# Patient Record
Sex: MALE | Race: BLACK OR AFRICAN AMERICAN | NOT HISPANIC OR LATINO | Employment: UNEMPLOYED | URBAN - METROPOLITAN AREA
[De-identification: names, ages, dates, MRNs, and addresses within clinical notes are randomized per-mention and may not be internally consistent; named-entity substitution may affect disease eponyms.]

---

## 2023-06-01 ENCOUNTER — HOSPITAL ENCOUNTER (EMERGENCY)
Facility: HOSPITAL | Age: 33
Discharge: HOME | End: 2023-06-01
Attending: EMERGENCY MEDICINE | Admitting: EMERGENCY MEDICINE

## 2023-06-01 VITALS
HEART RATE: 52 BPM | OXYGEN SATURATION: 99 % | DIASTOLIC BLOOD PRESSURE: 71 MMHG | SYSTOLIC BLOOD PRESSURE: 118 MMHG | RESPIRATION RATE: 18 BRPM | TEMPERATURE: 97.9 F | WEIGHT: 180 LBS

## 2023-06-01 DIAGNOSIS — M54.9 CHRONIC NECK AND BACK PAIN: ICD-10-CM

## 2023-06-01 DIAGNOSIS — G89.29 CHRONIC NECK AND BACK PAIN: ICD-10-CM

## 2023-06-01 DIAGNOSIS — R11.2 NAUSEA AND VOMITING: ICD-10-CM

## 2023-06-01 DIAGNOSIS — R10.13 ABDOMINAL PAIN, EPIGASTRIC: ICD-10-CM

## 2023-06-01 DIAGNOSIS — K21.9 ACID REFLUX: ICD-10-CM

## 2023-06-01 DIAGNOSIS — M54.2 CHRONIC NECK AND BACK PAIN: ICD-10-CM

## 2023-06-01 LAB
ALBUMIN SERPL BCG-MCNC: 4.3 G/DL (ref 3.5–5.2)
ALP SERPL-CCNC: 98 U/L (ref 40–129)
ALT SERPL W P-5'-P-CCNC: 33 U/L (ref 10–50)
ANION GAP SERPL CALCULATED.3IONS-SCNC: 13 MMOL/L (ref 7–15)
AST SERPL W P-5'-P-CCNC: 22 U/L (ref 10–50)
BASOPHILS # BLD AUTO: 0.1 10E3/UL (ref 0–0.2)
BASOPHILS NFR BLD AUTO: 1 %
BILIRUB DIRECT SERPL-MCNC: <0.2 MG/DL (ref 0–0.3)
BILIRUB SERPL-MCNC: 0.3 MG/DL
BUN SERPL-MCNC: 11.3 MG/DL (ref 6–20)
CALCIUM SERPL-MCNC: 9.7 MG/DL (ref 8.6–10)
CHLORIDE SERPL-SCNC: 104 MMOL/L (ref 98–107)
CREAT SERPL-MCNC: 0.9 MG/DL (ref 0.67–1.17)
DEPRECATED HCO3 PLAS-SCNC: 25 MMOL/L (ref 22–29)
EOSINOPHIL # BLD AUTO: 0.2 10E3/UL (ref 0–0.7)
EOSINOPHIL NFR BLD AUTO: 2 %
ERYTHROCYTE [DISTWIDTH] IN BLOOD BY AUTOMATED COUNT: 13.6 % (ref 10–15)
GFR SERPL CREATININE-BSD FRML MDRD: >90 ML/MIN/1.73M2
GLUCOSE SERPL-MCNC: 95 MG/DL (ref 70–99)
HCT VFR BLD AUTO: 47.3 % (ref 40–53)
HGB BLD-MCNC: 15.6 G/DL (ref 13.3–17.7)
IMM GRANULOCYTES # BLD: 0 10E3/UL
IMM GRANULOCYTES NFR BLD: 0 %
LIPASE SERPL-CCNC: 22 U/L (ref 13–60)
LYMPHOCYTES # BLD AUTO: 4.4 10E3/UL (ref 0.8–5.3)
LYMPHOCYTES NFR BLD AUTO: 49 %
MCH RBC QN AUTO: 29.4 PG (ref 26.5–33)
MCHC RBC AUTO-ENTMCNC: 33 G/DL (ref 31.5–36.5)
MCV RBC AUTO: 89 FL (ref 78–100)
MONOCYTES # BLD AUTO: 0.8 10E3/UL (ref 0–1.3)
MONOCYTES NFR BLD AUTO: 9 %
NEUTROPHILS # BLD AUTO: 3.4 10E3/UL (ref 1.6–8.3)
NEUTROPHILS NFR BLD AUTO: 39 %
NRBC # BLD AUTO: 0 10E3/UL
NRBC BLD AUTO-RTO: 0 /100
PLATELET # BLD AUTO: 310 10E3/UL (ref 150–450)
POTASSIUM SERPL-SCNC: 3.8 MMOL/L (ref 3.4–5.3)
PROT SERPL-MCNC: 7.6 G/DL (ref 6.4–8.3)
RBC # BLD AUTO: 5.31 10E6/UL (ref 4.4–5.9)
SODIUM SERPL-SCNC: 142 MMOL/L (ref 136–145)
WBC # BLD AUTO: 8.9 10E3/UL (ref 4–11)

## 2023-06-01 PROCEDURE — 82947 ASSAY GLUCOSE BLOOD QUANT: CPT

## 2023-06-01 PROCEDURE — 96375 TX/PRO/DX INJ NEW DRUG ADDON: CPT

## 2023-06-01 PROCEDURE — 250N000013 HC RX MED GY IP 250 OP 250 PS 637

## 2023-06-01 PROCEDURE — 96374 THER/PROPH/DIAG INJ IV PUSH: CPT

## 2023-06-01 PROCEDURE — 36415 COLL VENOUS BLD VENIPUNCTURE: CPT

## 2023-06-01 PROCEDURE — 82248 BILIRUBIN DIRECT: CPT

## 2023-06-01 PROCEDURE — 82374 ASSAY BLOOD CARBON DIOXIDE: CPT

## 2023-06-01 PROCEDURE — 83690 ASSAY OF LIPASE: CPT

## 2023-06-01 PROCEDURE — 250N000011 HC RX IP 250 OP 636

## 2023-06-01 PROCEDURE — 85025 COMPLETE CBC W/AUTO DIFF WBC: CPT

## 2023-06-01 PROCEDURE — 99284 EMERGENCY DEPT VISIT MOD MDM: CPT | Mod: 25

## 2023-06-01 RX ORDER — KETOROLAC TROMETHAMINE 15 MG/ML
15 INJECTION, SOLUTION INTRAMUSCULAR; INTRAVENOUS ONCE
Status: COMPLETED | OUTPATIENT
Start: 2023-06-01 | End: 2023-06-01

## 2023-06-01 RX ORDER — METHOCARBAMOL 750 MG/1
750 TABLET, FILM COATED ORAL 4 TIMES DAILY PRN
Qty: 20 TABLET | Refills: 0 | Status: SHIPPED | OUTPATIENT
Start: 2023-06-01

## 2023-06-01 RX ORDER — ONDANSETRON 4 MG/1
4 TABLET, ORALLY DISINTEGRATING ORAL EVERY 8 HOURS PRN
Qty: 10 TABLET | Refills: 0 | Status: SHIPPED | OUTPATIENT
Start: 2023-06-01 | End: 2023-06-04

## 2023-06-01 RX ORDER — CYCLOBENZAPRINE HCL 10 MG
10 TABLET ORAL ONCE
Status: COMPLETED | OUTPATIENT
Start: 2023-06-01 | End: 2023-06-01

## 2023-06-01 RX ORDER — LIDOCAINE 4 G/G
1 PATCH TOPICAL EVERY 24 HOURS
Qty: 3 PATCH | Refills: 0 | Status: SHIPPED | OUTPATIENT
Start: 2023-06-01

## 2023-06-01 RX ORDER — LIDOCAINE 4 G/G
1 PATCH TOPICAL ONCE
Status: DISCONTINUED | OUTPATIENT
Start: 2023-06-01 | End: 2023-06-01 | Stop reason: HOSPADM

## 2023-06-01 RX ORDER — ONDANSETRON 2 MG/ML
4 INJECTION INTRAMUSCULAR; INTRAVENOUS ONCE
Status: COMPLETED | OUTPATIENT
Start: 2023-06-01 | End: 2023-06-01

## 2023-06-01 RX ORDER — FAMOTIDINE 20 MG/1
20 TABLET, FILM COATED ORAL 2 TIMES DAILY
Qty: 30 TABLET | Refills: 0 | Status: SHIPPED | OUTPATIENT
Start: 2023-06-01

## 2023-06-01 RX ORDER — MAGNESIUM HYDROXIDE/ALUMINUM HYDROXICE/SIMETHICONE 120; 1200; 1200 MG/30ML; MG/30ML; MG/30ML
15 SUSPENSION ORAL ONCE
Status: COMPLETED | OUTPATIENT
Start: 2023-06-01 | End: 2023-06-01

## 2023-06-01 RX ADMIN — FAMOTIDINE 20 MG: 10 INJECTION, SOLUTION INTRAVENOUS at 09:15

## 2023-06-01 RX ADMIN — ONDANSETRON 4 MG: 2 INJECTION INTRAMUSCULAR; INTRAVENOUS at 09:40

## 2023-06-01 RX ADMIN — CYCLOBENZAPRINE 10 MG: 10 TABLET, FILM COATED ORAL at 09:40

## 2023-06-01 RX ADMIN — ALUMINUM HYDROXIDE, MAGNESIUM HYDROXIDE, AND SIMETHICONE 15 ML: 200; 200; 20 SUSPENSION ORAL at 09:15

## 2023-06-01 RX ADMIN — KETOROLAC TROMETHAMINE 15 MG: 15 INJECTION, SOLUTION INTRAMUSCULAR; INTRAVENOUS at 09:15

## 2023-06-01 RX ADMIN — LIDOCAINE 1 PATCH: 4 PATCH TOPICAL at 10:42

## 2023-06-01 ASSESSMENT — ENCOUNTER SYMPTOMS
RHINORRHEA: 0
CHILLS: 0
CONSTIPATION: 0
NECK STIFFNESS: 0
NECK PAIN: 1
BACK PAIN: 1
DIARRHEA: 0
WEAKNESS: 0
NAUSEA: 1
SORE THROAT: 0
SHORTNESS OF BREATH: 0
ABDOMINAL PAIN: 1
BLOOD IN STOOL: 0
COUGH: 0
FEVER: 0
NUMBNESS: 0
VOMITING: 1
BRUISES/BLEEDS EASILY: 0

## 2023-06-01 ASSESSMENT — ACTIVITIES OF DAILY LIVING (ADL): ADLS_ACUITY_SCORE: 35

## 2023-06-01 NOTE — DISCHARGE INSTRUCTIONS
You were seen in the ER for evaluation of abdominal pain. Your workup and imaging was overall reassuring as discussed.     Rest, stay well hydrated (Pedialyte), follow BRAT diet (bananas, rice, applesauce, toast), and increase diet as tolerates. You may take Zofran as needed for nausea. You were prescribed Pepcid to take to help with acid reflux.    Rest, no heavy lifting. You may apply ice or heat to the area (do not apply ice directly to the skin). You may use a topical pain relieving lidocaine patch - only use for 12 hours (do NOT use longer than 12 hours in a 24 hour period as this can cause toxicity). You can continue to use Ibuprofen (do not exceed 3200 mg in 24 hrs) and/or Tylenol (do not exceed 4000 mg in 24 hrs) as needed. You may take Robaxin (muscle relaxer) as needed for muscle spasm.    Tylenol (Acetaminophen) Discharge Instructions:  You may take 2 tablets of regular strength, over-the-counter, Tylenol (acetaminophen) every 4-6 hours as needed for pain.  Take no more than 4000 mg of Tylenol in a 24-hour period.      Avoid taking more than 1 acetaminophen-containing product at a time and be aware that many over-the-counter medications contain a combination of acetaminophen and other products.  If you are taking Tylenol in addition to a combination product please keep track of your daily acetaminophen dose to make sure you do not exceed the recommended 4000 mg.  Taking too much acetaminophen can cause permanent damage to your liver.    Ibuprofen/Naproxen Discharge Instructions:  You may take ibuprofen for pain control.  The maximum dose of (ibuprofen is 3200 mg ) in a 24-hour period.    Take this medication with food to prevent stomach irritation.  With long-term use this medication can irritate the stomach causing pain and lead to development of a stomach ulcer.  If you notice stomach pain or vomiting of coffee-ground colored vomit or blood, please be seen by a healthcare provider.  Attempt to use this  medication for the shortest time possible.      Follow-up with primary care provider for reevaluation and ongoing management, referral placed today.     You can follow up with [facility] if your symptoms persist for reevaluation and ongoing management strategies including but not limited to possible injection and physical therapy, referral placed today.    Return to the emergency department for any new or worsening symptoms including severe abdominal pain, vomiting, vomiting blood, multiple episodes of diarrhea, bloody stools, uncontrolled fever/chills, chest pain, shortness of breath, worsening pain, new numbness/tingling/weakness, loss of bowel or bladder function, or any other concerning symptoms.     Take Care!  - Shelly Short PA-C

## 2023-06-01 NOTE — ED NOTES
Pt alert and oriented x4. Ambulated with a steady gait. Discharged to home with family. Pt given printed rx. Pt instructed to follow up with spine if needed.

## 2023-06-01 NOTE — ED PROVIDER NOTES
EMERGENCY DEPARTMENT ENCOUNTER      NAME: Mateusz Purcell  AGE: 33 year old male  YOB: 1990  MRN: [id number]  EVALUATION DATE & TIME: 2023  8:32 AM    PCP: No primary care provider on file.    ED PROVIDER: Shelly Short PA-C      Chief Complaint   Patient presents with     Abdominal Pain     Back Pain         FINAL IMPRESSION:  1. Chronic neck and back pain    2. Abdominal pain, epigastric    3. Nausea and vomiting    4. Acid reflux          ED COURSE & MEDICAL DECISION MAKIN:43 AM I met with the patient to gather history and perform my exam. ED course and treatment discussed.   9:05 AM I staffed the patient with Dr. Jackson.  10:00 AM Reevaluated and updated patient. Abdominal pain resolved and abdomen non-tender on reevaluation. I discussed the plan for discharge with the patient, and patient is agreeable. We discussed supportive cares at home and reasons for return to the ER including new or worsening symptoms. All questions and concerns addressed. Patient to be discharged by RN.    Pertinent Labs & Imaging studies reviewed. (See chart for details)  33 year old male presents to the Emergency Department for evaluation of chronic neck and back pain as well as abdominal pain and nausea. Upon exam, patient is afebrile, hemodynamically stable, and in no acute distress. Very minimal epigastric tenderness to palpation, otherwise, non-tender without guarding, rebound, or other peritoneal signs. Paraspinal muscular tenderness improved with palpation along entire back, no midline tenderness or overlying skin changes. Neck full ROM without pain. No nuchal rigidity. Differential diagnosis includes but not limited to gastritis, GERD, cyclic vomiting secondary to cannabis use, pancreatitis, gallbladder etiology, electrolyte abnormality, anemia, dehydration, muscle spasm/strain, slipped disc. Based on patient's presenting symptoms, laboratories and imaging were ordered. Emergent MRI is not indicated as  no new weakness or evidence of cauda equina syndrome (no saddle anesthesia, bowel/bladder incontinence/retention). Low suspicion for an epidural abscess as the patient denies hx of IVDU, no fevers/chills, or recent procedures. Low suspicion for infiltrative vertebral tumor as no associated weight loss, night sweats, or known history of cancer. Low suspicion for fracture as there was no preceding trauma/injury. Using shared decision making, no imaging of the spine was performed. CBC without leukocytosis or anemia. BMP WNL. Lipase WNL. HFP WNL.     Patient was treated with Toradol, Pepcid, Maalox, Zofran, Flexeril and Lidocaine patch upon arrival with resolution of abdominal symptoms and mild improvement in back pain. Symptoms and workup most consistent with muscle strain/spasm and gastritis vs GERD vs marijuana-induced nausea/vomiting. Low suspicion for life-threatening intraabdominal pathology given reassuring work-up and improvement with medications. Upon reevaluation, abdomen non-tender. Using shared decision making, CT abdomen/pelvis deferred at this time. Patient was made aware of the above findings. Plan to discharge patient home with prescription Pepcid, Zofran, Robaxin, and lidocaine patches, strict return precautions, and close follow up with PCP and [facility] for reevaluation and ongoing management, referrals placed today. The patient was stable and well appearing upon discharge. The patient was advised to return to the ER if any new or worsening symptoms develop. The patient verbalizes understanding and agrees with the plan.     Medical Decision Making    History:    Supplemental history from: Documented in chart, if applicable and Friend    External Record(s) reviewed: Documented in chart, if applicable. and Other: [facility] visit from 11/5/22    Work Up:    Chart documentation includes differential considered and any EKGs or imaging independently interpreted by provider, where  specified.    In additional to work up documented, I considered the following work up: Documented in chart, if applicable.    External consultation:    Discussion of management with another provider: Documented in chart, if applicable    Complicating factors:    Care impacted by chronic illness: N/A    Care affected by social determinants of health: Alcohol Abuse and/or Recreational Drug Use and Other: No primary care    Disposition considerations: Discharge. I prescribed additional prescription strength medication(s) as charted. See documentation for any additional details.      MEDICATIONS GIVEN IN THE EMERGENCY:  Medications   Lidocaine (LIDOCARE) 4 % Patch 1 patch (1 patch Transdermal $Patch/Med Applied 6/1/23 1042)   alum & mag hydroxide-simethicone (MAALOX) suspension 15 mL (15 mLs Oral $Given 6/1/23 0915)   famotidine (PEPCID) injection 20 mg (20 mg Intravenous $Given 6/1/23 0915)   ketorolac (TORADOL) injection 15 mg (15 mg Intravenous $Given 6/1/23 0915)   ondansetron (ZOFRAN) injection 4 mg (4 mg Intravenous $Given 6/1/23 0940)   cyclobenzaprine (FLEXERIL) tablet 10 mg (10 mg Oral $Given 6/1/23 0940)       NEW PRESCRIPTIONS STARTED AT TODAY'S ER VISIT  Discharge Medication List as of 6/1/2023 10:37 AM      START taking these medications    Details   famotidine (PEPCID) 20 MG tablet Take 1 tablet (20 mg) by mouth 2 times daily, Disp-30 tablet, R-0, Local Print      Lidocaine (LIDOCARE) 4 % Patch Place 1 patch onto the skin every 24 hours To prevent lidocaine toxicity, patient should be patch free for 12 hrs daily.Disp-3 patch, R-0Local Print      methocarbamol (ROBAXIN) 750 MG tablet Take 1 tablet (750 mg) by mouth 4 times daily as needed for muscle spasms, Disp-20 tablet, R-0, Local Print      ondansetron (ZOFRAN ODT) 4 MG ODT tab Take 1 tablet (4 mg) by mouth every 8 hours as needed for nausea, Disp-10 tablet, R-0, Local Print             "    =================================================================    HPI    Patient information was obtained from: Patient and patient's friend    Use of : N/A         Mateusz Purcell is a 33 year old male with a pertinent history of daily marijuana use who presents to this ED via walk-in for evaluation of multiple complaints    Per chart review: The patient was seen at [facility] on 11/5/22 for back pain, nausea, and vomiting. Patient's labs and lumbar/thoracic x-rays were unremarkable. The patient was prescribed flexeril and Zofran. The patient was discharged with a referral to primary care for follow up.     The patient reports he has had back and neck pain that goes \"all the way down\" since he was 15. He states in the last \"couple days\" he developed diffuse abdominal pain, nausea, and vomiting as well. The patient notes the pain is worse in the epigastric region. The patient notes when he vomits, there is, \"nothing coming up\". The patient has been taking Pepto-Bismol with relief of his vomiting. The patient notes when he was younger, he was told he has ulcers, but denies getting an EGD procedure or following up with GI.   The patient notes he \"can't stand for too long\" because then his back will start to hurt which causes abdominal pain, nausea, then vomiting. The patient has not been taking anything for the pain, but hot showers will \"take the edge off\". The patient notes he was seen at the \"other doctor\" and was told he has a \"spine that turns\".   The patient reports daily marijuana use and notes he was told at the \"other doctor\" the nausea and vomiting was a \"cannabis thing\", but the patient denies this because he smokes marijuana \"every day\". The patient denies IV drug use.    The patient denies numbness or tingling in his limbs or groin, or loss of bowel or bladder. The patient denies fever, chills, cough, sore throat, runny nose, chest pain, shortness of breath, black or bloody stools, or " "diarrhea.     The patient's friend notes he has been dealing with this \"for a long time\", but he \"refused to go to the doctor\". She notes he was seen at [facility] early this year with the \"same thing\". The patient was given muscle relaxants which he states did not help the pain.     REVIEW OF SYSTEMS   Review of Systems   Constitutional: Negative for chills and fever.   HENT: Negative for congestion, rhinorrhea and sore throat.    Respiratory: Negative for cough and shortness of breath.    Cardiovascular: Negative for chest pain and leg swelling.   Gastrointestinal: Positive for abdominal pain, nausea and vomiting. Negative for blood in stool, constipation and diarrhea.   Musculoskeletal: Positive for back pain and neck pain. Negative for neck stiffness.   Skin: Negative for rash.   Neurological: Negative for weakness and numbness.   Hematological: Does not bruise/bleed easily.   All other systems reviewed and are negative.    PAST MEDICAL HISTORY:  History reviewed. No pertinent past medical history.    PAST SURGICAL HISTORY:  History reviewed. No pertinent surgical history.        CURRENT MEDICATIONS:    famotidine (PEPCID) 20 MG tablet  Lidocaine (LIDOCARE) 4 % Patch  methocarbamol (ROBAXIN) 750 MG tablet  ondansetron (ZOFRAN ODT) 4 MG ODT tab        ALLERGIES:  No Known Allergies    FAMILY HISTORY:  No family history on file.    SOCIAL HISTORY:        VITALS:  /71   Pulse 52   Temp 97.9  F (36.6  C) (Oral)   Resp 18   Wt 81.6 kg (180 lb)   SpO2 99%     PHYSICAL EXAM    Constitutional:  Alert, in no acute distress. Cooperative.  EYES: Conjunctivae clear.   HENT:  Atraumatic, normocephalic.   Respiratory:  Respirations even, unlabored, in no acute respiratory distress. Lungs clear to auscultation bilaterally without wheeze, rhonchi, or rales. No cough. Speaks in full sentences easily.  Cardiovascular:  Regular rate and rhythm, good peripheral perfusion. No peripheral edema. No chest wall tenderness.  GI: " Soft, flat, non-distended. Bowel sounds normal. Very mild epigastric tenderness to palpation, otherwise abdomen soft and non-tender with no guarding, rebound, or other peritoneal signs.  Musculoskeletal: Bilateral cervical, thoracic and lumbar paraspinal muscular tenderness improved with palpation/massage. No midline bony tenderness to palpation. No overlying erythema, warmth, purulence, fluctuance, crepitus, or stepoff. Full ROM neck without pain. No nuchal rigidity. No edema. No cyanosis. Range of motion major extremities intact. Compartments soft.  Integument: Warm, Dry. No rash to visualized skin.   Neurologic:  Alert & oriented. No focal deficits noted. 5/5 strength upper and lower extremities. Motor intact. Sensation intact. 2+ patellar reflexes bilaterally.   Psych: Normal mood and affect.      LAB:  All pertinent labs reviewed and interpreted.  Results for orders placed or performed during the hospital encounter of 06/01/23   Basic metabolic panel   Result Value Ref Range    Sodium 142 136 - 145 mmol/L    Potassium 3.8 3.4 - 5.3 mmol/L    Chloride 104 98 - 107 mmol/L    Carbon Dioxide (CO2) 25 22 - 29 mmol/L    Anion Gap 13 7 - 15 mmol/L    Urea Nitrogen 11.3 6.0 - 20.0 mg/dL    Creatinine 0.90 0.67 - 1.17 mg/dL    Calcium 9.7 8.6 - 10.0 mg/dL    Glucose 95 70 - 99 mg/dL    GFR Estimate >90 >60 mL/min/1.73m2   Hepatic function panel   Result Value Ref Range    Protein Total 7.6 6.4 - 8.3 g/dL    Albumin 4.3 3.5 - 5.2 g/dL    Bilirubin Total 0.3 <=1.2 mg/dL    Alkaline Phosphatase 98 40 - 129 U/L    AST 22 10 - 50 U/L    ALT 33 10 - 50 U/L    Bilirubin Direct <0.20 0.00 - 0.30 mg/dL   Result Value Ref Range    Lipase 22 13 - 60 U/L   CBC with platelets and differential   Result Value Ref Range    WBC Count 8.9 4.0 - 11.0 10e3/uL    RBC Count 5.31 4.40 - 5.90 10e6/uL    Hemoglobin 15.6 13.3 - 17.7 g/dL    Hematocrit 47.3 40.0 - 53.0 %    MCV 89 78 - 100 fL    MCH 29.4 26.5 - 33.0 pg    MCHC 33.0 31.5 - 36.5  g/dL    RDW 13.6 10.0 - 15.0 %    Platelet Count 310 150 - 450 10e3/uL    % Neutrophils 39 %    % Lymphocytes 49 %    % Monocytes 9 %    % Eosinophils 2 %    % Basophils 1 %    % Immature Granulocytes 0 %    NRBCs per 100 WBC 0 <1 /100    Absolute Neutrophils 3.4 1.6 - 8.3 10e3/uL    Absolute Lymphocytes 4.4 0.8 - 5.3 10e3/uL    Absolute Monocytes 0.8 0.0 - 1.3 10e3/uL    Absolute Eosinophils 0.2 0.0 - 0.7 10e3/uL    Absolute Basophils 0.1 0.0 - 0.2 10e3/uL    Absolute Immature Granulocytes 0.0 <=0.4 10e3/uL    Absolute NRBCs 0.0 10e3/uL       RADIOLOGY:  Reviewed all pertinent imaging. Please see official radiology report.  No orders to display       I, Sona Ayers, am serving as a scribe to document services personally performed by Shelly Short PA-C based on my observation and the provider's statements to me. I, Shelly Short PA-C, attest that Sona Ayers is acting in a scribe capacity, has observed my performance of the services and has documented them in accordance with my direction.    Shelly Short PA-C  [facility] EMERGENCY DEPARTMENT  [facility] [facility] [id number]  [phone]      Shelly Short PA-C  06/01/23 105

## 2023-06-01 NOTE — ED PROVIDER NOTES
"I, Freida Jackson have reviewed the documentation, personally taken the patient's history, performed an exam and agree with the physical finds, diagnosis and management plan.    HPI:  Mateusz Purcell is a 33 year old male with a pertinent history of daily marijuana use who presents to this ED via walk-in for evaluation of abdominal pain.    Patient reports diffuse abdominal pain with associating nausea and vomiting over the past couple of days. He says when he vomits, \"nothing comes up.\" He has been taking Pepto-bismol with relief. He notes that history had history of ulcers when he was younger. Patient has been using marijuana daily and was told by doctors that the nausea and vomiting \"was a cannabis thing.\" He denies associating numbness or tingling in his extremities or groin, bowel or urinary incontinence, fever, chills, cough, sore throat, rhinorrhea, chest pain, shortness of breath, melena, or bright red blood in stool, and diarrhea. There were no other concerns/complaints at this time.    Physical Exam: Well-appearing male, no acute distress.  Borderline bradycardia, regular rhythm.  Abdomen is benign, no reducible tenderness palpation on my examination.  Able to ambulate without difficulty    MDM: GERD, gastritis, pancreatitis, hepatobiliary pathology    ED Course/workup: Laboratory work-up unremarkable.  Got relief with antacids administered here.  Will discharge to home with antacids and PCP follow-up.      ED Course as of 06/01/23 1017   u Jun 01, 2023   0905 9:05 AM Patient was staffed with me by Shelly Short PA-C.   1011 10:12 AM I met with the patient to obtain patient history and performed a physical exam. Discussed plan for ED work up including potential diagnostic studies and interventions.       Final Diagnosis:     ICD-10-CM    1. Chronic neck and back pain  M54.2 Spine  Referral    M54.9     G89.29       2. Abdominal pain, epigastric  R10.13 Primary Care Referral      3. Nausea and " vomiting  R11.2 Primary Care Referral      4. Acid reflux  K21.9 Primary Care Referral              I personally saw the patient and performed a substantive portion of the visit including all aspects of the medical decision making.     MD Manuel Yoder Zabrina N, MD  06/01/23 1017

## 2023-06-01 NOTE — ED TRIAGE NOTES
"Pt has had recurring \"spine\" and abominal pain since his teenage years, with recent flare starting yesterday. Pt has had nausea/vomiting since yesterday, too.      Triage Assessment     Row Name 06/01/23 0830       Triage Assessment (Adult)    Airway WDL WDL       Respiratory WDL    Respiratory WDL WDL       Skin Circulation/Temperature WDL    Skin Circulation/Temperature WDL WDL       Cardiac WDL    Cardiac WDL WDL       Peripheral/Neurovascular WDL    Peripheral Neurovascular WDL WDL       Cognitive/Neuro/Behavioral WDL    Cognitive/Neuro/Behavioral WDL WDL              "

## 2024-12-10 ENCOUNTER — APPOINTMENT (OUTPATIENT)
Dept: ULTRASOUND IMAGING | Facility: HOSPITAL | Age: 34
End: 2024-12-10
Attending: EMERGENCY MEDICINE

## 2024-12-10 ENCOUNTER — APPOINTMENT (OUTPATIENT)
Dept: RADIOLOGY | Facility: HOSPITAL | Age: 34
End: 2024-12-10
Attending: EMERGENCY MEDICINE

## 2024-12-10 ENCOUNTER — HOSPITAL ENCOUNTER (EMERGENCY)
Facility: HOSPITAL | Age: 34
Discharge: HOME | End: 2024-12-10
Attending: EMERGENCY MEDICINE | Admitting: EMERGENCY MEDICINE

## 2024-12-10 VITALS
TEMPERATURE: 97.5 F | DIASTOLIC BLOOD PRESSURE: 80 MMHG | SYSTOLIC BLOOD PRESSURE: 136 MMHG | OXYGEN SATURATION: 98 % | WEIGHT: 165 LBS | RESPIRATION RATE: 19 BRPM | BODY MASS INDEX: 24.44 KG/M2 | HEIGHT: 69 IN | HEART RATE: 60 BPM

## 2024-12-10 DIAGNOSIS — K85.90 ACUTE PANCREATITIS, UNSPECIFIED COMPLICATION STATUS, UNSPECIFIED PANCREATITIS TYPE: ICD-10-CM

## 2024-12-10 LAB
ALBUMIN SERPL BCG-MCNC: 4.7 G/DL (ref 3.5–5.2)
ALP SERPL-CCNC: 83 U/L (ref 40–150)
ALT SERPL W P-5'-P-CCNC: 37 U/L (ref 0–70)
ANION GAP SERPL CALCULATED.3IONS-SCNC: 12 MMOL/L (ref 7–15)
AST SERPL W P-5'-P-CCNC: 18 U/L (ref 0–45)
BASOPHILS # BLD AUTO: 0 10E3/UL (ref 0–0.2)
BASOPHILS NFR BLD AUTO: 1 %
BILIRUB DIRECT SERPL-MCNC: <0.2 MG/DL (ref 0–0.3)
BILIRUB SERPL-MCNC: 0.3 MG/DL
BUN SERPL-MCNC: 10.7 MG/DL (ref 6–20)
CALCIUM SERPL-MCNC: 9.9 MG/DL (ref 8.8–10.4)
CHLORIDE SERPL-SCNC: 103 MMOL/L (ref 98–107)
CREAT SERPL-MCNC: 0.93 MG/DL (ref 0.67–1.17)
EGFRCR SERPLBLD CKD-EPI 2021: >90 ML/MIN/1.73M2
EOSINOPHIL # BLD AUTO: 0.2 10E3/UL (ref 0–0.7)
EOSINOPHIL NFR BLD AUTO: 3 %
ERYTHROCYTE [DISTWIDTH] IN BLOOD BY AUTOMATED COUNT: 13.7 % (ref 10–15)
FLUAV RNA SPEC QL NAA+PROBE: NEGATIVE
FLUBV RNA RESP QL NAA+PROBE: NEGATIVE
GLUCOSE SERPL-MCNC: 117 MG/DL (ref 70–99)
HCO3 SERPL-SCNC: 25 MMOL/L (ref 22–29)
HCT VFR BLD AUTO: 44.2 % (ref 40–53)
HGB BLD-MCNC: 14.9 G/DL (ref 13.3–17.7)
IMM GRANULOCYTES # BLD: 0 10E3/UL
IMM GRANULOCYTES NFR BLD: 0 %
LIPASE SERPL-CCNC: 209 U/L (ref 13–60)
LYMPHOCYTES # BLD AUTO: 3.2 10E3/UL (ref 0.8–5.3)
LYMPHOCYTES NFR BLD AUTO: 50 %
MCH RBC QN AUTO: 29.9 PG (ref 26.5–33)
MCHC RBC AUTO-ENTMCNC: 33.7 G/DL (ref 31.5–36.5)
MCV RBC AUTO: 89 FL (ref 78–100)
MONOCYTES # BLD AUTO: 0.6 10E3/UL (ref 0–1.3)
MONOCYTES NFR BLD AUTO: 10 %
NEUTROPHILS # BLD AUTO: 2.4 10E3/UL (ref 1.6–8.3)
NEUTROPHILS NFR BLD AUTO: 37 %
NRBC # BLD AUTO: 0 10E3/UL
NRBC BLD AUTO-RTO: 0 /100
PLATELET # BLD AUTO: 266 10E3/UL (ref 150–450)
POTASSIUM SERPL-SCNC: 4.1 MMOL/L (ref 3.4–5.3)
PROT SERPL-MCNC: 7.8 G/DL (ref 6.4–8.3)
RBC # BLD AUTO: 4.99 10E6/UL (ref 4.4–5.9)
RSV RNA SPEC NAA+PROBE: NEGATIVE
SARS-COV-2 RNA RESP QL NAA+PROBE: NEGATIVE
SODIUM SERPL-SCNC: 140 MMOL/L (ref 135–145)
WBC # BLD AUTO: 6.3 10E3/UL (ref 4–11)

## 2024-12-10 PROCEDURE — 96374 THER/PROPH/DIAG INJ IV PUSH: CPT

## 2024-12-10 PROCEDURE — 80053 COMPREHEN METABOLIC PANEL: CPT | Performed by: EMERGENCY MEDICINE

## 2024-12-10 PROCEDURE — 99285 EMERGENCY DEPT VISIT HI MDM: CPT | Mod: 25

## 2024-12-10 PROCEDURE — 250N000011 HC RX IP 250 OP 636: Performed by: EMERGENCY MEDICINE

## 2024-12-10 PROCEDURE — 82248 BILIRUBIN DIRECT: CPT | Performed by: EMERGENCY MEDICINE

## 2024-12-10 PROCEDURE — 71046 X-RAY EXAM CHEST 2 VIEWS: CPT

## 2024-12-10 PROCEDURE — 36415 COLL VENOUS BLD VENIPUNCTURE: CPT | Performed by: EMERGENCY MEDICINE

## 2024-12-10 PROCEDURE — 96375 TX/PRO/DX INJ NEW DRUG ADDON: CPT

## 2024-12-10 PROCEDURE — 87637 SARSCOV2&INF A&B&RSV AMP PRB: CPT | Performed by: EMERGENCY MEDICINE

## 2024-12-10 PROCEDURE — 85004 AUTOMATED DIFF WBC COUNT: CPT | Performed by: EMERGENCY MEDICINE

## 2024-12-10 PROCEDURE — 83690 ASSAY OF LIPASE: CPT | Performed by: EMERGENCY MEDICINE

## 2024-12-10 PROCEDURE — 80048 BASIC METABOLIC PNL TOTAL CA: CPT | Performed by: EMERGENCY MEDICINE

## 2024-12-10 PROCEDURE — 76705 ECHO EXAM OF ABDOMEN: CPT

## 2024-12-10 PROCEDURE — 84460 ALANINE AMINO (ALT) (SGPT): CPT | Performed by: EMERGENCY MEDICINE

## 2024-12-10 RX ORDER — ONDANSETRON 4 MG/1
8 TABLET, ORALLY DISINTEGRATING ORAL EVERY 8 HOURS PRN
Qty: 10 TABLET | Refills: 0 | Status: SHIPPED | OUTPATIENT
Start: 2024-12-10 | End: 2024-12-13

## 2024-12-10 RX ORDER — ONDANSETRON 2 MG/ML
4 INJECTION INTRAMUSCULAR; INTRAVENOUS ONCE
Status: COMPLETED | OUTPATIENT
Start: 2024-12-10 | End: 2024-12-10

## 2024-12-10 RX ORDER — IBUPROFEN 800 MG/1
800 TABLET, FILM COATED ORAL EVERY 8 HOURS PRN
Qty: 24 TABLET | Refills: 0 | Status: SHIPPED | OUTPATIENT
Start: 2024-12-10 | End: 2024-12-18

## 2024-12-10 RX ORDER — KETOROLAC TROMETHAMINE 15 MG/ML
15 INJECTION, SOLUTION INTRAMUSCULAR; INTRAVENOUS ONCE
Status: COMPLETED | OUTPATIENT
Start: 2024-12-10 | End: 2024-12-10

## 2024-12-10 RX ORDER — HYDROCODONE BITARTRATE AND ACETAMINOPHEN 5; 325 MG/1; MG/1
1 TABLET ORAL EVERY 6 HOURS PRN
Qty: 10 TABLET | Refills: 0 | Status: SHIPPED | OUTPATIENT
Start: 2024-12-10 | End: 2024-12-13

## 2024-12-10 RX ADMIN — ONDANSETRON 4 MG: 2 INJECTION INTRAMUSCULAR; INTRAVENOUS at 12:42

## 2024-12-10 RX ADMIN — KETOROLAC TROMETHAMINE 15 MG: 15 INJECTION, SOLUTION INTRAMUSCULAR; INTRAVENOUS at 12:42

## 2024-12-10 ASSESSMENT — ACTIVITIES OF DAILY LIVING (ADL)
ADLS_ACUITY_SCORE: 41
ADLS_ACUITY_SCORE: 41

## 2024-12-10 ASSESSMENT — COLUMBIA-SUICIDE SEVERITY RATING SCALE - C-SSRS
2. HAVE YOU ACTUALLY HAD ANY THOUGHTS OF KILLING YOURSELF IN THE PAST MONTH?: NO
1. IN THE PAST MONTH, HAVE YOU WISHED YOU WERE DEAD OR WISHED YOU COULD GO TO SLEEP AND NOT WAKE UP?: NO
6. HAVE YOU EVER DONE ANYTHING, STARTED TO DO ANYTHING, OR PREPARED TO DO ANYTHING TO END YOUR LIFE?: NO

## 2024-12-10 NOTE — Clinical Note
Leonardo Almeida was seen and treated in our emergency department on 12/10/2024.  He may return to work on 12/11/2024.       If you have any questions or concerns, please don't hesitate to call.      Lesli Lamb MD

## 2024-12-10 NOTE — ED PROVIDER NOTES
EMERGENCY DEPARTMENT ENCOUNTER      NAME: Leonardo Almeida  AGE: 34 year old male  YOB: 1990  MRN: [id number]  EVALUATION DATE & TIME: No admission date for patient encounter.    PCP: No primary care provider on file.    ED PROVIDER: Lesli Lamb M.D.      Chief Complaint   Patient presents with    Abdominal Pain    Cough         FINAL IMPRESSION:  1. Acute pancreatitis, unspecified complication status, unspecified pancreatitis type          ED COURSE & MEDICAL DECISION MAKING:    ED Course as of 12/10/24 1432   Tue Dec 10, 2024   1227 I met with the patient to obtain patient history and performed a physical exam. Discussed plan for ED work up including potential diagnostic studies and interventions.    1233 Pt with RUQ and epigastric pain with nausea, vomiting and fatigue with cough for 2 days, no medications taken, normal VS and normal lung auscultation, strong smoke odor present, no wheezing or h/o asthma/COPD reassuringly, ameanble to CXR and viral PCR r/o COVID19 and influenza, LFTs and lipase with BMP and CBC and RUQ US with epigastric/RUQ pain to ensure no acute hepatobiliary pathology and given ketorolac and zofran for nausea and pain   1402 Viral PCR negative reassuringly, lipase 209 thus acute pancreatitis likely, no statin, LFTs josue and CBC and BMP WNL thus Supai score 0 or 1 suggesting low risk pancreatitis, willd /w him re: EtOH use/abuse, IVF begun    1429 Pt reassessed, tolerating PO, ok with plans to discharge with vicodin and zofran PRN and GI follow up with no EtOH and no etiology of pancreatitis identified. Patient discharged after being provided with extensive anticipatory guidance and given return precautions, importance of PMD follow-up emphasized. He will hydrate aggressively this week to ensure safety and rapid improvement, no medications or etiology ascertained.       Pertinent Labs & Imaging studies reviewed. (See chart for details)    Medical Decision  Making  Obtained supplemental history:Supplemental history obtained?: No  Reviewed external records: External records reviewed?: No  Care impacted by chronic illness:Documented in Chart  Did you consider but not order tests?: Work up considered but not performed and documented in chart, if applicable  Did you interpret images independently?: Independent interpretation of ECG and images noted in documentation, when applicable.  Consultation discussion with other provider:Did you involve another provider (consultant, , pharmacy, etc.)?: No  Discharge. I prescribed additional prescription strength medication(s) as charted. I considered admission, but discharged patient after significant clinical improvement.    MIPS: Not Applicable      At the conclusion of the encounter I discussed the results of all of the tests and the disposition. The questions were answered. The patient or family acknowledged understanding and was agreeable with the care plan.     MEDICATIONS GIVEN IN THE EMERGENCY:  Medications   ondansetron (ZOFRAN) injection 4 mg (4 mg Intravenous $Given 12/10/24 1242)   ketorolac (TORADOL) injection 15 mg (15 mg Intravenous $Given 12/10/24 1242)       NEW PRESCRIPTIONS STARTED AT TODAY'S ER VISIT  New Prescriptions    HYDROCODONE-ACETAMINOPHEN (NORCO) 5-325 MG TABLET    Take 1 tablet by mouth every 6 hours as needed for severe pain.    IBUPROFEN (ADVIL/MOTRIN) 800 MG TABLET    Take 1 tablet (800 mg) by mouth every 8 hours as needed for mild pain or moderate pain.    ONDANSETRON (ZOFRAN ODT) 4 MG ODT TAB    Take 2 tablets (8 mg) by mouth every 8 hours as needed.          =================================================================    HPI      Leonardo Almeida is a 34 year old male with no PMHx who presents to the ED today via walk-in with abdominal pain and cough.     Patient presents to the ED for concerns of 2 days of cough, body aches and fatigue. He also endorses 2 days of epigastric abdominal  "pain that he describes as \"crampy\". He states that abdominal pain is moderately severe. The pain radiates to his right upper quadrant. He also endorses associated nausea and vomiting. He has not had his vaccination boosters this year yet. He has not taken any pain medications. He has not had any prior episodes like this. He has not had any prior surgery.     Patient denies any diarrhea, jaundice, fever, and sick contact. Patient states no other complaints or concerns at this time.        REVIEW OF SYSTEMS   All other systems reviewed and are negative except as noted above in HPI.    PAST MEDICAL HISTORY:  No past medical history on file.    PAST SURGICAL HISTORY:  No past surgical history on file.    CURRENT MEDICATIONS:    HYDROcodone-acetaminophen (NORCO) 5-325 MG tablet  ibuprofen (ADVIL/MOTRIN) 800 MG tablet  ondansetron (ZOFRAN ODT) 4 MG ODT tab        ALLERGIES:  No Known Allergies    FAMILY HISTORY:  No family history on file.    SOCIAL HISTORY:   Social History     Socioeconomic History    Marital status: Single       VITALS:  Patient Vitals for the past 24 hrs:   BP Temp Temp src Pulse Resp SpO2 Height Weight   12/10/24 1222 124/69 97.5  F (36.4  C) Oral 62 20 98 % 1.753 m (5' 9\") 74.8 kg (165 lb)       PHYSICAL EXAM    GENERAL: Awake, alert.  In no acute distress.   HEENT: Normocephalic, atraumatic.  Pupils equal, round and reactive.  Conjunctiva normal.  EOMI.  NECK: No stridor or apparent deformity.  PULMONARY: Symmetrical breath sounds without distress.  Lungs clear to auscultation bilaterally without wheezes, rhonchi or rales.  CARDIO: Regular rate and rhythm.  No significant murmur, rub or gallop.  Radial pulses strong and symmetrical.  ABDOMINAL: Abdomen soft, non-distended.  No CVAT, no palpable hepatosplenomegaly. Epigastric and RUQ tenderness.   EXTREMITIES: No lower extremity swelling or edema.    NEURO: Alert and oriented to person, place and time.  Cranial nerves grossly intact.  No focal motor " deficit.  PSYCH: Normal mood and affect  SKIN: No rashes      LAB:  All pertinent labs reviewed and interpreted.  Results for orders placed or performed during the hospital encounter of 12/10/24   XR Chest 2 Views    Impression    IMPRESSION: Negative chest.   US Abdomen Limited    Impression    IMPRESSION:  1.  Normal limited abdominal ultrasound.       Influenza A/B, RSV and SARS-CoV2 PCR (COVID-19) Nasopharyngeal    Specimen: Nasopharyngeal; Swab   Result Value Ref Range    Influenza A PCR Negative Negative    Influenza B PCR Negative Negative    RSV PCR Negative Negative    SARS CoV2 PCR Negative Negative   Basic metabolic panel   Result Value Ref Range    Sodium 140 135 - 145 mmol/L    Potassium 4.1 3.4 - 5.3 mmol/L    Chloride 103 98 - 107 mmol/L    Carbon Dioxide (CO2) 25 22 - 29 mmol/L    Anion Gap 12 7 - 15 mmol/L    Urea Nitrogen 10.7 6.0 - 20.0 mg/dL    Creatinine 0.93 0.67 - 1.17 mg/dL    GFR Estimate >90 >60 mL/min/1.73m2    Calcium 9.9 8.8 - 10.4 mg/dL    Glucose 117 (H) 70 - 99 mg/dL   Hepatic function panel   Result Value Ref Range    Protein Total 7.8 6.4 - 8.3 g/dL    Albumin 4.7 3.5 - 5.2 g/dL    Bilirubin Total 0.3 <=1.2 mg/dL    Alkaline Phosphatase 83 40 - 150 U/L    AST 18 0 - 45 U/L    ALT 37 0 - 70 U/L    Bilirubin Direct <0.20 0.00 - 0.30 mg/dL   Result Value Ref Range    Lipase 209 (H) 13 - 60 U/L   CBC with platelets and differential   Result Value Ref Range    WBC Count 6.3 4.0 - 11.0 10e3/uL    RBC Count 4.99 4.40 - 5.90 10e6/uL    Hemoglobin 14.9 13.3 - 17.7 g/dL    Hematocrit 44.2 40.0 - 53.0 %    MCV 89 78 - 100 fL    MCH 29.9 26.5 - 33.0 pg    MCHC 33.7 31.5 - 36.5 g/dL    RDW 13.7 10.0 - 15.0 %    Platelet Count 266 150 - 450 10e3/uL    % Neutrophils 37 %    % Lymphocytes 50 %    % Monocytes 10 %    % Eosinophils 3 %    % Basophils 1 %    % Immature Granulocytes 0 %    NRBCs per 100 WBC 0 <1 /100    Absolute Neutrophils 2.4 1.6 - 8.3 10e3/uL    Absolute Lymphocytes 3.2 0.8 - 5.3  10e3/uL    Absolute Monocytes 0.6 0.0 - 1.3 10e3/uL    Absolute Eosinophils 0.2 0.0 - 0.7 10e3/uL    Absolute Basophils 0.0 0.0 - 0.2 10e3/uL    Absolute Immature Granulocytes 0.0 <=0.4 10e3/uL    Absolute NRBCs 0.0 10e3/uL       RADIOLOGY:  Reviewed all pertinent imaging. Please see official radiology report.  US Abdomen Limited   Final Result   IMPRESSION:   1.  Normal limited abdominal ultrasound.            XR Chest 2 Views   Final Result   IMPRESSION: Negative chest.            I, Vic Wolfe, am serving as a scribe to document services personally performed by Dr. Lesli Lamb based on my observation and the provider's statements to me. I, Lesli Lamb MD attest that Vic Wolfe is acting in a scribe capacity, has observed my performance of the services and has documented them in accordance with my direction.       Lesli Lamb MD  12/10/24 [id number]

## 2024-12-10 NOTE — DISCHARGE INSTRUCTIONS
Our GI specialists will call you this week to help you to set up a follow up appointment with them in their office to make sure you are feeling improved and your pancreatitis has healed well. Our primary care medical scheduling team will also call to set up an appointment with you to refill your medications if needed and to make sure you are improving quickly.

## 2024-12-10 NOTE — ED TRIAGE NOTES
Patient presents here with back pain, originating from the lower back with radiation to his neck. Pain is chronic, but reports that is is worse over the past few days. He also notes that he has been vomiting over the past two days. He is also noting upper abdominal pain. He notes a cough as well.      Triage Assessment (Adult)       Row Name 12/10/24 1224          Triage Assessment    Airway WDL WDL        Respiratory WDL    Respiratory WDL WDL        Skin Circulation/Temperature WDL    Skin Circulation/Temperature WDL WDL        Cardiac WDL    Cardiac WDL WDL        Peripheral/Neurovascular WDL    Peripheral Neurovascular WDL WDL        Cognitive/Neuro/Behavioral WDL    Cognitive/Neuro/Behavioral WDL WDL

## 2025-04-17 ENCOUNTER — APPOINTMENT (OUTPATIENT)
Dept: CT IMAGING | Facility: HOSPITAL | Age: 35
End: 2025-04-17
Attending: EMERGENCY MEDICINE
Payer: MEDICAID

## 2025-04-17 ENCOUNTER — HOSPITAL ENCOUNTER (EMERGENCY)
Facility: HOSPITAL | Age: 35
Discharge: HOME | End: 2025-04-17
Attending: EMERGENCY MEDICINE
Payer: MEDICAID

## 2025-04-17 VITALS
OXYGEN SATURATION: 98 % | TEMPERATURE: 98.5 F | DIASTOLIC BLOOD PRESSURE: 83 MMHG | HEIGHT: 69 IN | SYSTOLIC BLOOD PRESSURE: 126 MMHG | HEART RATE: 60 BPM | RESPIRATION RATE: 17 BRPM | WEIGHT: 167 LBS | BODY MASS INDEX: 24.73 KG/M2

## 2025-04-17 DIAGNOSIS — G89.29 CHRONIC MIDLINE BACK PAIN, UNSPECIFIED BACK LOCATION: ICD-10-CM

## 2025-04-17 DIAGNOSIS — R11.2 NAUSEA AND VOMITING, UNSPECIFIED VOMITING TYPE: ICD-10-CM

## 2025-04-17 DIAGNOSIS — M54.9 CHRONIC MIDLINE BACK PAIN, UNSPECIFIED BACK LOCATION: ICD-10-CM

## 2025-04-17 DIAGNOSIS — R10.84 GENERALIZED ABDOMINAL PAIN: ICD-10-CM

## 2025-04-17 LAB
ALBUMIN SERPL BCG-MCNC: 4.3 G/DL (ref 3.5–5.2)
ALBUMIN UR-MCNC: 30 MG/DL
ALP SERPL-CCNC: 102 U/L (ref 40–150)
ALT SERPL W P-5'-P-CCNC: 53 U/L (ref 0–70)
AMPHETAMINES UR QL SCN: ABNORMAL
ANION GAP SERPL CALCULATED.3IONS-SCNC: 12 MMOL/L (ref 7–15)
APPEARANCE UR: CLEAR
AST SERPL W P-5'-P-CCNC: 24 U/L (ref 0–45)
BARBITURATES UR QL SCN: ABNORMAL
BASOPHILS # BLD AUTO: 0 10E3/UL (ref 0–0.2)
BASOPHILS NFR BLD AUTO: 0 %
BENZODIAZ UR QL SCN: ABNORMAL
BILIRUB DIRECT SERPL-MCNC: 0.1 MG/DL (ref 0–0.3)
BILIRUB SERPL-MCNC: 0.3 MG/DL
BILIRUB UR QL STRIP: NEGATIVE
BUN SERPL-MCNC: 9.7 MG/DL (ref 6–20)
BZE UR QL SCN: ABNORMAL
CALCIUM SERPL-MCNC: 9.8 MG/DL (ref 8.8–10.4)
CANNABINOIDS UR QL SCN: ABNORMAL
CHLORIDE SERPL-SCNC: 102 MMOL/L (ref 98–107)
COLOR UR AUTO: ABNORMAL
CREAT SERPL-MCNC: 0.9 MG/DL (ref 0.67–1.17)
EGFRCR SERPLBLD CKD-EPI 2021: >90 ML/MIN/1.73M2
EOSINOPHIL # BLD AUTO: 0.1 10E3/UL (ref 0–0.7)
EOSINOPHIL NFR BLD AUTO: 2 %
ERYTHROCYTE [DISTWIDTH] IN BLOOD BY AUTOMATED COUNT: 13.4 % (ref 10–15)
FENTANYL UR QL: ABNORMAL
GLUCOSE SERPL-MCNC: 95 MG/DL (ref 70–99)
GLUCOSE UR STRIP-MCNC: NEGATIVE MG/DL
HCO3 SERPL-SCNC: 27 MMOL/L (ref 22–29)
HCT VFR BLD AUTO: 46.7 % (ref 40–53)
HGB BLD-MCNC: 15.4 G/DL (ref 13.3–17.7)
HGB UR QL STRIP: NEGATIVE
HOLD SPECIMEN: NORMAL
IMM GRANULOCYTES # BLD: 0 10E3/UL
IMM GRANULOCYTES NFR BLD: 0 %
KETONES UR STRIP-MCNC: 40 MG/DL
LEUKOCYTE ESTERASE UR QL STRIP: NEGATIVE
LIPASE SERPL-CCNC: 85 U/L (ref 13–60)
LYMPHOCYTES # BLD AUTO: 3 10E3/UL (ref 0.8–5.3)
LYMPHOCYTES NFR BLD AUTO: 36 %
MAGNESIUM SERPL-MCNC: 1.8 MG/DL (ref 1.7–2.3)
MCH RBC QN AUTO: 29.3 PG (ref 26.5–33)
MCHC RBC AUTO-ENTMCNC: 33 G/DL (ref 31.5–36.5)
MCV RBC AUTO: 89 FL (ref 78–100)
MONOCYTES # BLD AUTO: 0.7 10E3/UL (ref 0–1.3)
MONOCYTES NFR BLD AUTO: 9 %
MUCOUS THREADS #/AREA URNS LPF: PRESENT /LPF
NEUTROPHILS # BLD AUTO: 4.5 10E3/UL (ref 1.6–8.3)
NEUTROPHILS NFR BLD AUTO: 53 %
NITRATE UR QL: NEGATIVE
NRBC # BLD AUTO: 0 10E3/UL
NRBC BLD AUTO-RTO: 0 /100
OPIATES UR QL SCN: ABNORMAL
PCP QUAL URINE (ROCHE): ABNORMAL
PH UR STRIP: 6.5 [PH] (ref 5–7)
PLATELET # BLD AUTO: 346 10E3/UL (ref 150–450)
POTASSIUM SERPL-SCNC: 3.9 MMOL/L (ref 3.4–5.3)
PROT SERPL-MCNC: 7.3 G/DL (ref 6.4–8.3)
RBC # BLD AUTO: 5.25 10E6/UL (ref 4.4–5.9)
RBC URINE: 1 /HPF
SODIUM SERPL-SCNC: 141 MMOL/L (ref 135–145)
SP GR UR STRIP: 1.03 (ref 1–1.03)
TROPONIN T SERPL HS-MCNC: <6 NG/L
UROBILINOGEN UR STRIP-MCNC: NORMAL MG/DL
WBC # BLD AUTO: 8.4 10E3/UL (ref 4–11)
WBC URINE: <1 /HPF

## 2025-04-17 PROCEDURE — 83690 ASSAY OF LIPASE: CPT | Performed by: EMERGENCY MEDICINE

## 2025-04-17 PROCEDURE — 74174 CTA ABD&PLVS W/CONTRAST: CPT

## 2025-04-17 PROCEDURE — 96374 THER/PROPH/DIAG INJ IV PUSH: CPT | Mod: 59

## 2025-04-17 PROCEDURE — 71275 CT ANGIOGRAPHY CHEST: CPT

## 2025-04-17 PROCEDURE — 85004 AUTOMATED DIFF WBC COUNT: CPT | Performed by: EMERGENCY MEDICINE

## 2025-04-17 PROCEDURE — 999N000104 CT THORACIC SPINE RECONSTRUCTED

## 2025-04-17 PROCEDURE — 80048 BASIC METABOLIC PNL TOTAL CA: CPT | Performed by: EMERGENCY MEDICINE

## 2025-04-17 PROCEDURE — 84075 ASSAY ALKALINE PHOSPHATASE: CPT | Performed by: EMERGENCY MEDICINE

## 2025-04-17 PROCEDURE — 83735 ASSAY OF MAGNESIUM: CPT | Performed by: EMERGENCY MEDICINE

## 2025-04-17 PROCEDURE — 96376 TX/PRO/DX INJ SAME DRUG ADON: CPT

## 2025-04-17 PROCEDURE — 999N000104 CT LUMBAR SPINE RECONSTRUCTED

## 2025-04-17 PROCEDURE — 250N000011 HC RX IP 250 OP 636: Mod: JZ | Performed by: EMERGENCY MEDICINE

## 2025-04-17 PROCEDURE — 96375 TX/PRO/DX INJ NEW DRUG ADDON: CPT

## 2025-04-17 PROCEDURE — 85041 AUTOMATED RBC COUNT: CPT | Performed by: EMERGENCY MEDICINE

## 2025-04-17 PROCEDURE — 258N000003 HC RX IP 258 OP 636: Performed by: EMERGENCY MEDICINE

## 2025-04-17 PROCEDURE — 99285 EMERGENCY DEPT VISIT HI MDM: CPT | Mod: 25

## 2025-04-17 PROCEDURE — 96361 HYDRATE IV INFUSION ADD-ON: CPT

## 2025-04-17 PROCEDURE — 82310 ASSAY OF CALCIUM: CPT | Performed by: EMERGENCY MEDICINE

## 2025-04-17 PROCEDURE — 36415 COLL VENOUS BLD VENIPUNCTURE: CPT | Performed by: EMERGENCY MEDICINE

## 2025-04-17 PROCEDURE — 81003 URINALYSIS AUTO W/O SCOPE: CPT | Performed by: EMERGENCY MEDICINE

## 2025-04-17 PROCEDURE — 250N000013 HC RX MED GY IP 250 OP 250 PS 637: Performed by: EMERGENCY MEDICINE

## 2025-04-17 PROCEDURE — 93005 ELECTROCARDIOGRAM TRACING: CPT | Performed by: EMERGENCY MEDICINE

## 2025-04-17 PROCEDURE — 82248 BILIRUBIN DIRECT: CPT | Performed by: EMERGENCY MEDICINE

## 2025-04-17 PROCEDURE — 250N000011 HC RX IP 250 OP 636: Performed by: EMERGENCY MEDICINE

## 2025-04-17 PROCEDURE — 84484 ASSAY OF TROPONIN QUANT: CPT | Performed by: EMERGENCY MEDICINE

## 2025-04-17 PROCEDURE — 81001 URINALYSIS AUTO W/SCOPE: CPT | Performed by: EMERGENCY MEDICINE

## 2025-04-17 PROCEDURE — 80307 DRUG TEST PRSMV CHEM ANLYZR: CPT | Performed by: EMERGENCY MEDICINE

## 2025-04-17 RX ORDER — ONDANSETRON 4 MG/1
4 TABLET, ORALLY DISINTEGRATING ORAL EVERY 8 HOURS PRN
Qty: 10 TABLET | Refills: 0 | Status: SHIPPED | OUTPATIENT
Start: 2025-04-17

## 2025-04-17 RX ORDER — MORPHINE SULFATE 4 MG/ML
4 INJECTION, SOLUTION INTRAMUSCULAR; INTRAVENOUS ONCE
Status: COMPLETED | OUTPATIENT
Start: 2025-04-17 | End: 2025-04-17

## 2025-04-17 RX ORDER — OXYCODONE HYDROCHLORIDE 5 MG/1
5 TABLET ORAL ONCE
Status: COMPLETED | OUTPATIENT
Start: 2025-04-17 | End: 2025-04-17

## 2025-04-17 RX ORDER — KETOROLAC TROMETHAMINE 15 MG/ML
15 INJECTION, SOLUTION INTRAMUSCULAR; INTRAVENOUS ONCE
Status: COMPLETED | OUTPATIENT
Start: 2025-04-17 | End: 2025-04-17

## 2025-04-17 RX ORDER — IOPAMIDOL 755 MG/ML
75 INJECTION, SOLUTION INTRAVASCULAR ONCE
Status: COMPLETED | OUTPATIENT
Start: 2025-04-17 | End: 2025-04-17

## 2025-04-17 RX ORDER — ONDANSETRON 2 MG/ML
4 INJECTION INTRAMUSCULAR; INTRAVENOUS ONCE
Status: COMPLETED | OUTPATIENT
Start: 2025-04-17 | End: 2025-04-17

## 2025-04-17 RX ADMIN — ONDANSETRON 4 MG: 2 INJECTION, SOLUTION INTRAMUSCULAR; INTRAVENOUS at 08:15

## 2025-04-17 RX ADMIN — OXYCODONE HYDROCHLORIDE 5 MG: 5 TABLET ORAL at 08:15

## 2025-04-17 RX ADMIN — ONDANSETRON 4 MG: 2 INJECTION, SOLUTION INTRAMUSCULAR; INTRAVENOUS at 05:28

## 2025-04-17 RX ADMIN — SODIUM CHLORIDE 1000 ML: 0.9 INJECTION, SOLUTION INTRAVENOUS at 07:00

## 2025-04-17 RX ADMIN — PANTOPRAZOLE SODIUM 40 MG: 40 INJECTION, POWDER, FOR SOLUTION INTRAVENOUS at 05:28

## 2025-04-17 RX ADMIN — MORPHINE SULFATE 4 MG: 4 INJECTION, SOLUTION INTRAMUSCULAR; INTRAVENOUS at 05:28

## 2025-04-17 RX ADMIN — IOPAMIDOL 75 ML: 755 INJECTION, SOLUTION INTRAVENOUS at 05:48

## 2025-04-17 RX ADMIN — KETOROLAC TROMETHAMINE 15 MG: 15 INJECTION, SOLUTION INTRAMUSCULAR; INTRAVENOUS at 08:15

## 2025-04-17 ASSESSMENT — ACTIVITIES OF DAILY LIVING (ADL)
ADLS_ACUITY_SCORE: 41

## 2025-04-17 NOTE — ED TRIAGE NOTES
Patient brought in by EMS from home due to abdominal pain and vomiting. Reports pain has been there for a long time but became severe tonight. Pain is on his right upper quadrant with the pain scale of 10/10 and vomited 5-6 times. Patient took Tylenol at 4am today.

## 2025-04-17 NOTE — ED NOTES
Discharge paperwork, follow up care and prescriptions discussed with pt. Pt verbalized understanding.

## 2025-04-17 NOTE — ED PROVIDER NOTES
EMERGENCY DEPARTMENT ENCOUNTER      NAME: Leonardo Almeida  AGE: 34 year old male  YOB: 1990  MRN: [id number]  EVALUATION DATE & TIME: 4/17/2025  4:54 AM    PCP: No Ref-Primary, Physician    ED PROVIDER: Shanon Steven M.D.        Chief Complaint   Patient presents with    Abdominal Pain         FINAL IMPRESSION:    1. Nausea and vomiting, unspecified vomiting type    2. Generalized abdominal pain    3. Chronic midline back pain, unspecified back location            MEDICAL DECISION MAKING:    Leonardo Almeida is a 34 year old male with history of frequent abdominal pain, tobacco use, who presents to the ER with complaints of abdominal pain and back pain.  Patient states he has been having pain like this ever since he is 15 years old and no one has been able to figure it out.  He states that he has pain in the thoracic and lumbar spine and he thinks this is causing his abdominal pain.  This is then associated with nausea and vomiting without diarrhea.  He specifically denied alcohol use or marijuana use.  Denies drug use but does admit to smoking a traditional cigarette today.  Positive for THC products.  It is possible that it is the THC products that are contributing to his abdominal pain and vomiting.  Overall exam quite benign.  Laboratories reassuring and imaging reassuring.    At this time I feel the patient is appropriate for discharge home to establish with primary care for ongoing workup and follow-up and referral also sent to spine clinic given his reports of back pain has been going on for many years.  No red flags on exam or history.  I do not think he requires emergent MRI imaging of the spine or other further imaging of the spine at this time.        ED COURSE:  5:15 AM  I met with the patient to gather history and perform my exam. ED course and treatment discussed.     8:10 AM  Updated patient on results.  He was sleeping comfortably when I entered the room.  Overall  appears quite comfortable.  He is requesting more pain medication.  This time I think he is appropriate for another dose of IV Zofran, Toradol, and oral oxycodone.  I feel that he is appropriate for discharge home with prescription for Zofran and follow-up with with primary care as an outpatient.  Referral has been sent for him to help establish with primary care as he states he now has health insurance and also with spine clinic for his back pain.    This represents an acute spinal emergency such as cauda equina, epidural abscess, discitis, etc.  CT of the abdomen is otherwise reassuring.  Laboratories look good.    Positive for THC products which could contribute to abdominal pain and vomiting.  Patient denies any THC use to me.    At this time I feel the patient is otherwise appropriate for discharge home    I considered ACS, PE, ruptured AAA, aortic dissection, bowel obstruction, bowel ischemia, cholecystitis, pancreatitis, appendicitis, diverticulitis, kidney stone, pyelonephritis, incarcerated or strangulated hernia, testicular torsion, viscus perforation, perforated GI ulcer, and other such etiologies at this time.    At the conclusion of the encounter I discussed the results of all of the tests and the disposition. Their questions were answered. The patient (and any family present) acknowledged understanding and were agreeable with the care plan.      CONSULTANTS:  none      MEDICATIONS GIVEN IN THE EMERGENCY:  Medications   ondansetron (ZOFRAN) injection 4 mg (has no administration in time range)   oxyCODONE (ROXICODONE) tablet 5 mg (has no administration in time range)   ketorolac (TORADOL) injection 15 mg (has no administration in time range)   pantoprazole (PROTONIX) IV push injection 40 mg (40 mg Intravenous $Given 4/17/25 0528)   ondansetron (ZOFRAN) injection 4 mg (4 mg Intravenous $Given 4/17/25 0528)   morphine (PF) injection 4 mg (4 mg Intravenous $Given 4/17/25 0528)   iopamidol (ISOVUE-370)  "solution 75 mL (75 mLs Intravenous $Given 4/17/25 3315)   sodium chloride 0.9% BOLUS 1,000 mL (1,000 mLs Intravenous $New Bag 4/17/25 0700)           NEW PRESCRIPTIONS STARTED AT TODAY'S ER VISIT     Medication List        Started      ondansetron 4 MG ODT tab  Commonly known as: ZOFRAN ODT  4 mg, Oral, EVERY 8 HOURS PRN                  CONDITION:  stable        DISPOSITION:  D.c home         =================================================================  =================================================================  TRIAGE ASSESSMENT:  Patient brought in by EMS from home due to abdominal pain and vomiting. Reports pain has been there for a long time but became severe tonight. Pain is on his right upper quadrant with the pain scale of 10/10 and vomited 5-6 times. Patient took Tylenol at 4am today.      ED Triage Vitals   Encounter Vitals Group      BP 04/17/25 0457 138/79      Systolic BP Percentile --       Diastolic BP Percentile --       Pulse 04/17/25 0457 63      Resp 04/17/25 0457 20      Temp 04/17/25 0457 98.5  F (36.9  C)      Temp src 04/17/25 0457 Oral      SpO2 04/17/25 0457 97 %      Weight 04/17/25 0501 75.8 kg (167 lb)      Height 04/17/25 0501 1.753 m (5' 9\")          ================================================================  ================================================================    HPI    Patient information was obtained from: patient    Use of Intrepreter: N/A      Leonardo Erlinda Almeida is a 34 year old male with history of frequent abdominal pain, tobacco use, who presents to the ER with complaints of abdominal pain and back pain.  Patient states he has been having pain like this ever since he is 15 years old and no one has been able to figure it out.  He states that he has pain in the thoracic and lumbar spine and he thinks this is causing his abdominal pain.  This is then associated with nausea and vomiting without diarrhea.  He denies alcohol use or marijuana use.  " Denies drug use but does admit to smoking a traditional cigarette today.    He states he has been told to follow-up with spine in the past but states the phone number did not work.  He states he otherwise has not followed up with primary care as he only recently got insurance.    Presents fevers, cough, shortness of breath, diarrhea, dysuria, hematuria.    He reports the pain is in the upper abdomen and under his rib cage.      CHART REVIEW:  Shows that patient was seen back on December 10, 2024 for abdominal pain and diagnosed with acute pancreatitis.    I also see an ED visit back from June 2023 at which time patient presented to the emergency department abdominal pain.  It was reported at that time that he had a history of daily marijuana use.      REVIEW OF SYSTEMS   ROS: 10 point ROS neg other than the symptoms noted above in the HPI.      PAST MEDICAL HISTORY:  History reviewed. No pertinent past medical history.      PAST SURGICAL HISTORY:  History reviewed. No pertinent surgical history.      CURRENT MEDICATIONS:    Prior to Admission medications    Medication Sig Start Date End Date Taking? Authorizing Provider   famotidine (PEPCID) 20 MG tablet Take 1 tablet (20 mg) by mouth 2 times daily 6/1/23   Martita Johnson PA-C   Lidocaine (LIDOCARE) 4 % Patch Place 1 patch onto the skin every 24 hours To prevent lidocaine toxicity, patient should be patch free for 12 hrs daily. 6/1/23   Martita Johnson PA-C   methocarbamol (ROBAXIN) 750 MG tablet Take 1 tablet (750 mg) by mouth 4 times daily as needed for muscle spasms 6/1/23   Martita Johnson PA-C         ALLERGIES:  No Known Allergies      FAMILY HISTORY:  History reviewed. No pertinent family history.      SOCIAL HISTORY:  Social History     Socioeconomic History    Marital status: Single   Tobacco Use    Smoking status: Unknown   Substance and Sexual Activity    Drug use: Yes     Types: Marijuana     Comment: daily         VITALS:  Patient Vitals for the past  "24 hrs:   BP Temp Temp src Pulse Resp SpO2 Height Weight   04/17/25 0745 132/81 -- -- 67 17 97 % -- --   04/17/25 0730 119/62 -- -- 58 17 97 % -- --   04/17/25 0715 119/71 -- -- 58 16 98 % -- --   04/17/25 0700 120/72 -- -- 57 14 98 % -- --   04/17/25 0645 130/84 -- -- 51 14 99 % -- --   04/17/25 0636 134/76 -- -- 52 21 97 % -- --   04/17/25 0621 120/73 -- -- 58 15 97 % -- --   04/17/25 0606 125/71 -- -- 58 22 98 % -- --   04/17/25 0556 131/88 -- -- 59 14 97 % -- --   04/17/25 0532 (!) 146/86 -- -- 61 -- 99 % -- --   04/17/25 0516 139/81 -- -- 61 -- 96 % -- --   04/17/25 0506 126/70 -- -- 64 -- 98 % -- --   04/17/25 0501 -- -- -- -- -- -- 1.753 m (5' 9\") 75.8 kg (167 lb)   04/17/25 0457 138/79 98.5  F (36.9  C) Oral 63 20 97 % -- --       Wt Readings from Last 3 Encounters:   04/17/25 75.8 kg (167 lb)   12/10/24 74.8 kg (165 lb)   06/01/23 81.6 kg (180 lb)       Estimated Creatinine Clearance: 122.8 mL/min (based on SCr of 0.9 mg/dL).    PHYSICAL EXAM    Constitutional:  Well developed, Well nourished, NAD  HENT:  Normocephalic, Atraumatic, Bilateral external ears normal, Nose normal. Neck- Supple, No stridor.   Eyes:  PERRL, EOMI, Conjunctiva normal, No discharge.  Respiratory:  Normal breath sounds, No respiratory distress, No wheezing, Speaks full sentences easily. No cough.   Cardiovascular:  Normal heart rate, Regular rhythm, No murmurs , No rubs, No gallops. Chest wall nontender.   GI:  No excessive obesity.  Bowel sounds normal, Soft, +mild mid and upper abd tenderness, No masses, No flank tenderness. No rebound or guarding.   : deferred  Musculoskeletal:  No cyanosis, No clubbing. Good range of motion in all major joints. No major deformities noted. +tenderness of mid and lower thoracic spine and entire lumbar spine.  Integument:  Warm, Dry, No erythema, No rash.  No petechiae.   Neurologic:  Alert & oriented x 3  Psychiatric:  Affect normal, Cooperative      LAB:  All pertinent labs reviewed and " interpreted.  Recent Results (from the past 24 hours)   Basic metabolic panel    Collection Time: 04/17/25  5:08 AM   Result Value Ref Range    Sodium 141 135 - 145 mmol/L    Potassium 3.9 3.4 - 5.3 mmol/L    Chloride 102 98 - 107 mmol/L    Carbon Dioxide (CO2) 27 22 - 29 mmol/L    Anion Gap 12 7 - 15 mmol/L    Urea Nitrogen 9.7 6.0 - 20.0 mg/dL    Creatinine 0.90 0.67 - 1.17 mg/dL    GFR Estimate >90 >60 mL/min/1.73m2    Calcium 9.8 8.8 - 10.4 mg/dL    Glucose 95 70 - 99 mg/dL   Hepatic function panel    Collection Time: 04/17/25  5:08 AM   Result Value Ref Range    Protein Total 7.3 6.4 - 8.3 g/dL    Albumin 4.3 3.5 - 5.2 g/dL    Bilirubin Total 0.3 <=1.2 mg/dL    Alkaline Phosphatase 102 40 - 150 U/L    AST 24 0 - 45 U/L    ALT 53 0 - 70 U/L    Bilirubin Direct 0.10 0.00 - 0.30 mg/dL   Lipase    Collection Time: 04/17/25  5:08 AM   Result Value Ref Range    Lipase 85 (H) 13 - 60 U/L   Magnesium    Collection Time: 04/17/25  5:08 AM   Result Value Ref Range    Magnesium 1.8 1.7 - 2.3 mg/dL   Extra Blue Top Tube    Collection Time: 04/17/25  5:08 AM   Result Value Ref Range    Hold Specimen JIC    Extra Red Top Tube    Collection Time: 04/17/25  5:08 AM   Result Value Ref Range    Hold Specimen JIC    Extra Green Top (Lithium Heparin) Tube    Collection Time: 04/17/25  5:08 AM   Result Value Ref Range    Hold Specimen JIC    CBC with platelets and differential    Collection Time: 04/17/25  5:08 AM   Result Value Ref Range    WBC Count 8.4 4.0 - 11.0 10e3/uL    RBC Count 5.25 4.40 - 5.90 10e6/uL    Hemoglobin 15.4 13.3 - 17.7 g/dL    Hematocrit 46.7 40.0 - 53.0 %    MCV 89 78 - 100 fL    MCH 29.3 26.5 - 33.0 pg    MCHC 33.0 31.5 - 36.5 g/dL    RDW 13.4 10.0 - 15.0 %    Platelet Count 346 150 - 450 10e3/uL    % Neutrophils 53 %    % Lymphocytes 36 %    % Monocytes 9 %    % Eosinophils 2 %    % Basophils 0 %    % Immature Granulocytes 0 %    NRBCs per 100 WBC 0 <1 /100    Absolute Neutrophils 4.5 1.6 - 8.3 10e3/uL     "Absolute Lymphocytes 3.0 0.8 - 5.3 10e3/uL    Absolute Monocytes 0.7 0.0 - 1.3 10e3/uL    Absolute Eosinophils 0.1 0.0 - 0.7 10e3/uL    Absolute Basophils 0.0 0.0 - 0.2 10e3/uL    Absolute Immature Granulocytes 0.0 <=0.4 10e3/uL    Absolute NRBCs 0.0 10e3/uL   Troponin T, High Sensitivity    Collection Time: 04/17/25  5:08 AM   Result Value Ref Range    Troponin T, High Sensitivity <6 <=22 ng/L   UA with Microscopic reflex to Culture    Collection Time: 04/17/25  7:00 AM    Specimen: Urine, Clean Catch   Result Value Ref Range    Color Urine Light Yellow Colorless, Straw, Light Yellow, Yellow    Appearance Urine Clear Clear    Glucose Urine Negative Negative mg/dL    Bilirubin Urine Negative Negative    Ketones Urine 40 (A) Negative mg/dL    Specific Gravity Urine 1.030 1.001 - 1.030    Blood Urine Negative Negative    pH Urine 6.5 5.0 - 7.0    Protein Albumin Urine 30 (A) Negative mg/dL    Urobilinogen Urine Normal Normal mg/dL    Nitrite Urine Negative Negative    Leukocyte Esterase Urine Negative Negative    Mucus Urine Present (A) None Seen /LPF    RBC Urine 1 <=2 /HPF    WBC Urine <1 <=5 /HPF   Urine Drug Screen Panel    Collection Time: 04/17/25  7:00 AM   Result Value Ref Range    Amphetamines Urine Screen Negative Screen Negative    Barbituates Urine Screen Negative Screen Negative    Benzodiazepine Urine Screen Negative Screen Negative    Cannabinoids Urine Screen Positive (A) Screen Negative    Cocaine Urine Screen Negative Screen Negative    Fentanyl Qual Urine Screen Negative Screen Negative    Opiates Urine Screen Positive (A) Screen Negative    PCP Urine Screen Negative Screen Negative       No results found for: \"ABORH\"        RADIOLOGY:  Reviewed all pertinent imaging. Please see official radiology report.    CT Lumbar Spine Reconstructed   Final Result   IMPRESSION:   1.  No evidence of an acute lumbar spine fracture.   2.  Good anatomic alignment and vertebral body heights maintained.   3.  No " significant canal compromise or neural foraminal narrowing throughout lumbar spine.            CT Thoracic Spine Reconstructed   Final Result   IMPRESSION:   1.  No evidence of an acute thoracic spine fracture.   2.  Good anatomic alignment and vertebral body heights maintained.   3.  No significant canal compromise or neural foraminal narrowing throughout thoracic spine.         CTA Chest Abdomen Pelvis w Contrast   Final Result   IMPRESSION:   1.  Normal aorta. Negative for aneurysm or dissection. No pulmonary emboli and no acute vascular findings.      2.  Mild bullous change versus paraseptal emphysema in the lung apices. No significant findings elsewhere in the chest.      3.  Questionable mild increased wall enhancement involving a few loops of small bowel in the right lower quadrant. This is a borderline finding on CT and could be seen with a mild or low-grade localized small bowel enteritis. Clinical correlation.      4.  Remainder of the exam is unremarkable. Normal appendix.                           EKG:    Indication: Chest pain     Performed at: 05:34a  Impression: Sinus rhythm at 64 bpm.  Flipped T waves noted in lead aVR and V1.  No ST elevation appreciated.  IL interval 186 ms, QRS 94 ms, QTc 427 ms.  Overall nonspecific EKG.  No prior EKGs for comparison.      I have independently reviewed and interpreted the EKG(s) documented above.        PROCEDURES:  none    Medical Decision Making  I reviewed the EMR: Outpatient Record: see HPI  Discharge. I prescribed additional prescription strength medication(s) as charted. I considered admission, but ultimately discharged patient exam was quite benign, laboratories reassuring and imaging reassuring..    MIPS (CTPE, Dental pain, Bellamy, Sinusitis, Asthma/COPD, Head Trauma): Not Applicable    SEPSIS: None      Shanon Steven M.D. [facility]  Emergency Medicine and Medical Toxicology  Formerly [facility] EMERGENCY  DEPARTMENT  [address] [place] [id number]  [phone]  Dept: [phone]           Shanon Steven MD  04/17/25 0814

## 2025-04-17 NOTE — Clinical Note
Leonardo Almeida was seen and treated in our emergency department on 4/17/2025.  He may return to work on 04/21/2025.       If you have any questions or concerns, please don't hesitate to call.      Shanon Steven MD

## 2025-04-17 NOTE — ED NOTES
Bed: JNED-05  Expected date: 4/17/25  Expected time: 4:48 AM  Means of arrival: Ambulance  Comments:  Mplwd  35 yo M  Abd pain / vomiting

## 2025-04-17 NOTE — DISCHARGE INSTRUCTIONS
CT scan of your abdomen does show a little inflammation of some of the intestines.  This could be leading to your discomfort and the vomiting.  Take the ondansetron nausea and vomiting medication as directed if needed.    Imaging of the back looked good.  I do feel that it is important for you to follow-up as an outpatient to have this looked into further.  I provided contact information for you to call and establish with a primary care doctor. You can also call to schedule an appointment at the Sp[facility].    Emergency department if you develop worse vomiting, worse abdominal pain, worse back pain, new numbness or weakness in your legs, or any other concerns.    Thank you for choosing [facility] Emergency Department.  It has been my pleasure caring for you today.     ~Dr. Tenisha MD

## 2025-04-22 LAB
ATRIAL RATE - MUSE: 64 BPM
DIASTOLIC BLOOD PRESSURE - MUSE: 86 MMHG
INTERPRETATION ECG - MUSE: NORMAL
P AXIS - MUSE: 75 DEGREES
PR INTERVAL - MUSE: 186 MS
QRS DURATION - MUSE: 94 MS
QT - MUSE: 414 MS
QTC - MUSE: 427 MS
R AXIS - MUSE: 29 DEGREES
SYSTOLIC BLOOD PRESSURE - MUSE: 146 MMHG
T AXIS - MUSE: 63 DEGREES
VENTRICULAR RATE- MUSE: 64 BPM